# Patient Record
(demographics unavailable — no encounter records)

---

## 2024-11-07 NOTE — REVIEW OF SYSTEMS
[Negative] : Allergic/Immunologic [de-identified] : Pins and needles in feet > hands, starting to improve

## 2024-11-07 NOTE — PHYSICAL EXAM
[Fully active, able to carry on all pre-disease performance without restriction] : Status 0 - Fully active, able to carry on all pre-disease performance without restriction [Obese] : obese [Normal] : affect appropriate [de-identified] : Minimal edema in RUE which is stable [de-identified] : Bilateral ALBINA flaps with palpable mass in right, lateral upper reconstructed breast.  RIght breast approximately 2:00 o'clock ~ 1x1 cm nodularity, consistent with fat necrosis. + Tattoo 3D nipples and areolar

## 2024-11-07 NOTE — OB HISTORY
[Definite:  ___ (Date)] : the last menstrual period was [unfilled] [Normal Amount/Duration] : was of a normal amount and duration [Regular Cycle Intervals] : periods have been regular [Menarche Age: ____] : age at menarche was [unfilled] [___] : Full Term: [unfilled]

## 2024-11-07 NOTE — HISTORY OF PRESENT ILLNESS
[Disease: _____________________] : Disease: [unfilled] [T: ___] : T[unfilled] [N: ___] : N[unfilled] [M: ___] : M[unfilled] [AJCC Stage: ____] : AJCC Stage: [unfilled] [de-identified] : Left breast ADH 5/08 Left breast LCIS on excisional biopsy 6/11/08 Left breast cancer diagnosed at age 48. 2/2015 Left breast mass seen on MRI  2015 Biopsy revealed poorly differentiated IDC measuring at least 1.1 cm with SBR 9/9 and ER/MI negative and HER-2 positive 3/19/15 Bilateral mastectomies and bilateral SLN by Dr. Palleschi with ALBINA flap reconstruction 4/30/15 - 7/15 Taxotere/Carboplatin/Herceptin x 4 with Neulasta day 2 7/15 - 3/31/16 Herceptin continued every 3 weeks  10/29/16 G[employer]u 24 gene panel negative for clinically significant mutations 4/16 Removal of mediport and developed DVT in subclavian vein. Treated with Xarelto until 3/18 and then as per Vascular Surgery d/c Xarelto and started aspirin 11/24/22 RUE DVT recurrence with no provoking event. Doppler revealed acute DVT of right mid/lateral subclavian, axillary, brachial veins, and superficial venous thrombosis of right basilic vein. Chronic DVT/venous change involving the right internal jugular and medial right subclavian veins. Xarelto started. [de-identified] : 1.8 cm Poorly differentiated IDC, SBR 8/9, ER/VA negative, HER-2 positive, 3 SLN negative [de-identified] : Jami underwent Bilateral mastectomies in 3/2015 followed by [facility] for ER/[place] negative, HER-2 positive disease. She had a RUE DVT in the subclavian vein in 2017 associated with her mediport emoval. She developed a RUE DVT on Thanksgiving, 2022. She was seen in the [facility] ED and Doppler showed  acute DVT of right mid/lateral subclavian, axillary, brachial veins, and superficial venous thrombosis of right basilic vein. Chronic DVT/venous change involving the right internal jugular and medial right subclavian veins. Xarelto was started. She has mild persistent neuropathy in her feet, which is managed fairly well with Cymbalta. The lymphedema has been fairly well controlled and has improved while on Xarelto for the recurrent RUE DVT She continues to be very anxious about her risk of cancer recurrence but is doing well overall  Routine Health Maintenance: PCP: Dr. Christian Barnes Gyn: Dr. Miryam Leon Mammogram: s/p bilateral mastectomies Pap Smear: 5/25/23 negative Bone density: baseline study 02/09/21 normal Colonoscopy: 2/22/19 negative Genetics: 10/29/16 [employer]s 24 gene panel negative for clinically significant mutations                  Heterozygous for Prothrombin G gene mutation

## 2024-12-02 NOTE — HISTORY OF PRESENT ILLNESS
[FreeTextEntry1] : feb 2015 bx w breast  ca  s/p aron mastectomy  s/p  rt sided post  for chemo dx w dvt after port was removed  was started on xarelto pt states that she has intermittent  right base of the neck mild discomfort and intermittent right arm swelling w mild discomfort  w activity  which improves w rest  [de-identified] : pt is here to evaluate RUE denies problems with RUE right arm swelling is overall stable hx of RUE dvt is on lifelong xarelto  20 mg daily  pt has a positive  w/u for  hypercoag state of leiden fact 5  pt was recommended by her hematologist  lifelong anticoag rx

## 2024-12-02 NOTE — PHYSICAL EXAM
[2+] : left 2+ [No Rash or Lesion] : No rash or lesion [Alert] : alert [Oriented to Person] : oriented to person [Oriented to Place] : oriented to place [Oriented to Time] : oriented to time [Calm] : calm [1+] : left 1+ [Right Carotid Bruit] : no bruit heard over the right carotid [Left Carotid Bruit] : no bruit heard over the left carotid [Ankle Swelling (On Exam)] : not present [de-identified] : nad [de-identified] : wnl  [de-identified] : no respiratory distress [FreeTextEntry1] : mild rue edema no significant leg edema no wounds [de-identified] : wnl [de-identified] : Kun Cranial nerves 2-12 kun grossly intact [de-identified] : cooperative

## 2024-12-02 NOTE — ASSESSMENT
[Arterial/Venous Disease] : arterial/venous disease [Medication Management] : medication management [FreeTextEntry1] : Impression pt w hypercoag state w recurrent rue and sv and innom vein dvt with resolution, clinically stable    Plan Med/conserv management rue elevation and compression sleeve prn continue xarelto lifelong as per heme xarelto management as per heme ov w rue venous duplex s/o dvt in 12 mo dec 2025 rto prn

## 2024-12-02 NOTE — DATA REVIEWED
[FreeTextEntry1] : 6/23/2017 Right arm and neck duplex  sig for chronic partial  thrombus in the  Rt IJ w leda thrombus in rt innominate v  and rt subclavian vein  wall thickening  rt ax v, brach v and basilic v  neg for dvt  Outside facility study  report reviewed  11/24/2022  RUE venous duplex                                sig for  acute dvt of  right  mid/lateral  SV, axillary v , brachia v  and svt of basilic v                               sig for  chronic dvt of   rt ijv,  medial sv   12/20/2022   RUE Venous Duplex  sig for chronic ijv, innom v part recannaliz dvt                                                         sig for  sub acute  non occ  dvt in subclav v and axillary v    1/24/2023  RUE Venous Duplex  sig for chronic recannaliz  ijv                                                     sig for  sub acute  non occ  dvt in innom v, subclav v                                                        resolution of ax v  dvt    5/9/2023  RUE Venous Duplex  sig for chronic non occ IJV and prox SV                                                       patent and compressible rt ax v   11/14/2023 RUE Venous Duplex sig for chronic recanalized DVT in innominate vein, IJV, SV and ax vein  12/2/2024 RUE Venous Duplex sig for chronic recanalized dvt in IJV, innominate vein, sub and ax vein

## 2024-12-16 NOTE — HISTORY OF PRESENT ILLNESS
[FreeTextEntry1] : planning d and c friday. for bleeding. BP elevated at other offices. Reports taking medications including carvedilol as directed.   Raisa "She is doing well and has been taking her carvedilol as directed.  She had a rough time for the first 4 days but has since been feeling better. She is accompanied by her daughter. She is taking all her medications as directed.  Prior: Didnt tolerate aspirin due to rash. She is taknig her meds as directed.  Off aspirin and plavix (vaginal bleeding) Off enalapril too with improved rash.  LDL at goal. TG unchanged A1c = 7.2

## 2024-12-16 NOTE — DISCUSSION/SUMMARY
[FreeTextEntry1] : She is a 57-year-old woman with stage I breast cancer, status post lumpectomy, who is s/p Herceptin related cardiomyopathy that has resolved with medical therapy and discontinuation of Herceptin. Prothombin gene mutation and Factor V Leyden. Didn't tolerate anti-platelet therapy due to bleeding. ECG today shows normal sinus rhythm with LVH HTN: Blood pressure is high consistently.  Continue carvedilol 25 mg twice daily. Will add diovan 80 mg bid. BP followup with Zuri in 1 month With the addition of valsartan her blood pressure should improve and she may proceed with the planned GYN surgery. She was instructed to withhold her Xarelto for 2 full days prior to the surgery.  I agree. She will restart anticoagulation once the bleeding from the surgery has stopped.   [EKG obtained to assist in diagnosis and management of assessed problem(s)] : EKG obtained to assist in diagnosis and management of assessed problem(s)

## 2024-12-17 NOTE — HISTORY OF PRESENT ILLNESS
[No Pertinent Pulmonary History] : no history of asthma, COPD, sleep apnea, or smoking [No Adverse Anesthesia Reaction] : no adverse anesthesia reaction in self or family member [Chronic Anticoagulation] : chronic anticoagulation [Chronic Kidney Disease] : no chronic kidney disease [Diabetes] : diabetes [(Patient denies any chest pain, claudication, dyspnea on exertion, orthopnea, palpitations or syncope)] : Patient denies any chest pain, claudication, dyspnea on exertion, orthopnea, palpitations or syncope [FreeTextEntry1] : D+C [FreeTextEntry2] : 12/20/24 [FreeTextEntry3] : Dr. Leon [FreeTextEntry4] : Presents for presurgical clearance.  Denies recent illness, cough, temp elevation, urinary symptoms. Note - has seen Cardiology. [FreeTextEntry5] : Patient has stable cardiomyopathy - followed by Cardiology

## 2024-12-17 NOTE — RESULTS/DATA
[] : results reviewed [de-identified] : acceptable [de-identified] : acceptable [de-identified] : HbA1C acceptable for this procedure

## 2025-02-06 NOTE — DISCUSSION/SUMMARY
[FreeTextEntry1] : She is a 58-year-old woman with stage I breast cancer, status post lumpectomy, who is s/p Herceptin related cardiomyopathy that has resolved with medical therapy and discontinuation of Herceptin. Prothrombin gene mutation and Factor V Leyden. Didn't tolerate anti-platelet therapy due to bleeding. ECG today shows normal sinus rhythm with LVH HTN: Blood pressure is high consistently. continue amlodipine 5,  Increase carvedilol to 37.5 mg twice daily.  (didn't tolerate ARB) See me in a few weeks for BP monitoring. [EKG obtained to assist in diagnosis and management of assessed problem(s)] : EKG obtained to assist in diagnosis and management of assessed problem(s)

## 2025-02-06 NOTE — HISTORY OF PRESENT ILLNESS
[FreeTextEntry1] : Taking meds as directed. Some ankle edema while on vacation in [facility]. New generic carvediol noted.    Prior: planning d and c friday. for bleeding. BP elevated at other offices. Reports taking medications including carvedilol as directed.  Raisa "She is doing well and has been taking her carvedilol as directed.  She had a rough time for the first 4 days but has since been feeling better. She is accompanied by her daughter. She is taking all her medications as directed.  Prior: Didnt tolerate aspirin due to rash. She is taknig her meds as directed.  Off aspirin and plavix (vaginal bleeding) Off enalapril too with improved rash.  LDL at goal. TG unchanged A1c = 7.2

## 2025-02-19 NOTE — PHYSICAL EXAM
[Alert] : alert [Normal Voice/Communication] : normal voice/communication [Healthy Appearing] : healthy appearing [No Acute Distress] : no acute distress [Sclera] : the sclera and conjunctiva were normal [Hearing Threshold Finger Rub Not [place]] : hearing was normal [Normal Lips/Gums] : the lips and gums were normal [Oropharynx] : the oropharynx was normal [Normal Appearance] : the appearance of the neck was normal [No Neck Mass] : no neck mass was observed [No Respiratory Distress] : no respiratory distress [No Acc Muscle Use] : no accessory muscle use [Auscultation Breath Sounds / Voice Sounds] : lungs were clear to auscultation bilaterally [Respiration, Rhythm And Depth] : normal respiratory rhythm and effort [Heart Rate And Rhythm] : heart rate was normal and rhythm regular [Normal S1, S2] : normal S1 and S2 [Murmurs] : no murmurs [Bowel Sounds] : normal bowel sounds [Abdomen Tenderness] : non-tender [No Masses] : no abdominal mass palpated [Abdomen Soft] : soft [] : no hepatosplenomegaly [Oriented To Time, Place, And Person] : oriented to person, place, and time

## 2025-02-19 NOTE — HISTORY OF PRESENT ILLNESS
[FreeTextEntry1] : Jami for a follow-up visit.  She relates that as long as she takes brand-name Nexium 40 mg every other day alternating with famotidine 40 mg, her heartburn is well-controlled.  When she tries to stretch the Nexium to more than 2 days she develops heartburn symptoms.  She denies a dysphagia or odynophagia.  She denies abdominal pain, nausea or vomiting.  She denies changes in bowel habits.  She denies rectal bleeding or melena.  She denies weight loss or anemia.

## 2025-02-19 NOTE — ASSESSMENT
[FreeTextEntry1] : This is a 58-year-old female with chronic GERD dependent on Nexium 40 mg daily.  She had tried generic brand Nexium and other proton pump inhibitors without response or have side effects of diarrhea.  The brand-name Nexium seems to be only medication that works for her.  She is trying to stretch out the frequency of taking Nexium but seems to can go as far as every other day before getting symptoms.  She alternates the Nexium with famotidine 40 mg daily.  She is currently having a hard time obtaining Nexium from her insurance company as it seems that her insurance company currently is allowing 90-day supply for a year.  Last year, she did not require a prior authorization to have Nexium as long as it is sent in as a "brand name".  Will again try to appeal/obtain prior authorization for her to receive Nexium.  I wrote a letter of medical necessity.

## 2025-02-20 NOTE — DISCUSSION/SUMMARY
[FreeTextEntry1] : She is a 58-year-old woman with stage I breast cancer, status post lumpectomy, who is s/p Herceptin related cardiomyopathy that has resolved with medical therapy and discontinuation of Herceptin. Prothrombin gene mutation and Factor V Leyden. Didn't tolerate anti-platelet therapy due to bleeding. ECG today shows normal sinus rhythm with LVH HTN: Blood pressure is much improved. Continue amlodipine 5, carvedilol to 37.5 mg twice daily.  (didn't tolerate ARB) 6 months followup. [EKG obtained to assist in diagnosis and management of assessed problem(s)] : EKG obtained to assist in diagnosis and management of assessed problem(s)

## 2025-02-20 NOTE — HISTORY OF PRESENT ILLNESS
[FreeTextEntry1] : She has been taking her medication without difficulty. No more ankle edema.  Prior: Taking meds as directed. Some ankle edema while on vacation in [facility]. New generic carvediol noted.  Prior: planning d and c friday. for bleeding. BP elevated at other offices. Reports taking medications including carvedilol as directed.  Mariluro "She is doing well and has been taking her carvedilol as directed.  She had a rough time for the first 4 days but has since been feeling better. She is accompanied by her daughter. She is taking all her medications as directed.  Prior: Didnt tolerate aspirin due to rash. She is taknig her meds as directed.  Off aspirin and plavix (vaginal bleeding) Off enalapril too with improved rash.  LDL at goal. TG unchanged A1c = 7.2

## 2025-03-17 NOTE — ASSESSMENT
[FreeTextEntry1] : Hemodynamically stable with acceptable BP and no clinical evidence of decompensation Lab profiles drawn in office and sent

## 2025-03-17 NOTE — HISTORY OF PRESENT ILLNESS
[de-identified] : Presents for BP check, labs, and general follow-up.  States feeling generally well; trying to maintain a healthy diet - note desirable weight loss.

## 2025-07-30 NOTE — ASSESSMENT
[FreeTextEntry1] : H/O left LCIS/ADH (2008) and left breast cancer (Stage IA, ER-/MA-/Her2+, 3/2015) Bilateral palpable fat necrosis Right upper extremity lymphedema No evidence of disease recurrence on CBE   1. Follow up office visit due 8/2026. 2. Advised monthly self breast examinations and advised her to contact me if she has any concerns.

## 2025-07-30 NOTE — HISTORY OF PRESENT ILLNESS
[FreeTextEntry1] : She has a history of Stage IA left breast cancer (ER-/MO-/Her2+) 6/2008 status post left upper outer surgical excision biopsy for LCIS/ADH 3/19/2015 status post bilateral total mastectomies, left axillary sentinel lymph node biopsy with bilateral ALBINA flap reconstruction 10/23/2015 status post nipple areolar reconstruction, revision and fat grafting Status post nipple areolar tattooing x 3 Plastic Surgeon: Dr. Adan. She had 3-D N/A tattooing in December Genetic testing negative Med ONC: Dr. Obregon. She completed chemotherapy with [facility]. She saw her 11/7/2024. WTH: right upper extremity lymphedema DVT of right arm November 24th, on Xarelto. She denies any new breast concerns.

## 2025-07-30 NOTE — PHYSICAL EXAM
[Normocephalic] : normocephalic [Atraumatic] : atraumatic [EOMI] : extra ocular movement intact [Sclera nonicteric] : sclera nonicteric [Supple] : supple [No Supraclavicular Adenopathy] : no supraclavicular adenopathy [No Cervical Adenopathy] : no cervical adenopathy [No Thyromegaly] : no thyromegaly [Examined in the supine and seated position] : examined in the supine and seated position [Asymmetrical] : asymmetrical [No dominant masses] : no dominant masses in right breast  [No dominant masses] : no dominant masses left breast [No Axillary Lymphadenopathy] : no left axillary lymphadenopathy [No Edema] : no edema [No Rashes] : no rashes [No Ulceration] : no ulceration [de-identified] : Subtle palp left supraclavicular measuring 6 x 7 mm c/w lipoma [de-identified] : S/P bilateral mastectomies with ALBINA flaps, N/A reconstruction.  Her right breast is slightly leija than her left. [de-identified] : Known palpable fat necrosis 3:30 (N9.5cm) subtle 5 x 5 mm without change. [de-identified] : Known palpable fat necrosis 8:30 (N8cm) 2.8 x 3.1 cm without change. [de-identified] : Right upper extremity lymphedema: moderate upper arm, mild fingers and forearm

## 2025-07-30 NOTE — CONSULT LETTER
[Dear  ___] : Dear  [unfilled], [Courtesy Letter:] : I had the pleasure of seeing your patient, [unfilled], in my office today. [Please see my note below.] : Please see my note below. [Sincerely,] : Sincerely, [FreeTextEntry3] : Susan M. Palleschi, MD, FACS\par  Division of Breast Surgery\par  Director, Breast Surgery\par  [facility]\par  [address] [place] [address]\par  [address]\[place] [address]\par  (Phone) [phone]\par  (Fax) [phone]  [DrMadonna  ___] : Dr. OSUNA

## 2025-07-30 NOTE — CONSULT LETTER
[Dear  ___] : Dear  [unfilled], [Courtesy Letter:] : I had the pleasure of seeing your patient, [unfilled], in my office today. [Please see my note below.] : Please see my note below. [Sincerely,] : Sincerely, [FreeTextEntry3] : Susan M. Palleschi, MD, FACS\par  Division of Breast Surgery\par  Director, Breast Surgery\par  [facility]\par  [address] [place] [address]\par  [address]\[place] [address]\par  (Phone) [phone]\par  (Fax) [phone]  [DrMadonna  ___] : Dr. OSUNA

## 2025-07-30 NOTE — ASSESSMENT
[FreeTextEntry1] : H/O left LCIS/ADH (2008) and left breast cancer (Stage IA, ER-/DE-/Her2+, 3/2015) Bilateral palpable fat necrosis Right upper extremity lymphedema No evidence of disease recurrence on CBE   1. Follow up office visit due 8/2026. 2. Advised monthly self breast examinations and advised her to contact me if she has any concerns.

## 2025-07-30 NOTE — PHYSICAL EXAM
[Normocephalic] : normocephalic [Atraumatic] : atraumatic [EOMI] : extra ocular movement intact [Sclera nonicteric] : sclera nonicteric [Supple] : supple [No Supraclavicular Adenopathy] : no supraclavicular adenopathy [No Cervical Adenopathy] : no cervical adenopathy [No Thyromegaly] : no thyromegaly [Examined in the supine and seated position] : examined in the supine and seated position [Asymmetrical] : asymmetrical [No dominant masses] : no dominant masses in right breast  [No dominant masses] : no dominant masses left breast [No Axillary Lymphadenopathy] : no left axillary lymphadenopathy [No Edema] : no edema [No Rashes] : no rashes [No Ulceration] : no ulceration [de-identified] : Subtle palp left supraclavicular measuring 6 x 7 mm c/w lipoma [de-identified] : S/P bilateral mastectomies with ALBINA flaps, N/A reconstruction.  Her right breast is slightly leija than her left. [de-identified] : Known palpable fat necrosis 3:30 (N9.5cm) subtle 5 x 5 mm without change. [de-identified] : Known palpable fat necrosis 8:30 (N8cm) 2.8 x 3.1 cm without change. [de-identified] : Right upper extremity lymphedema: moderate upper arm, mild fingers and forearm

## 2025-07-30 NOTE — HISTORY OF PRESENT ILLNESS
[FreeTextEntry1] : She has a history of Stage IA left breast cancer (ER-/[place]-/Her2+) 6/2008 status post left upper outer surgical excision biopsy for LCIS/ADH 3/19/2015 status post bilateral total mastectomies, left axillary sentinel lymph node biopsy with bilateral ALBINA flap reconstruction 10/23/2015 status post nipple areolar reconstruction, revision and fat grafting Status post nipple areolar tattooing x 3 Plastic Surgeon: Dr. Adan. She had 3-D N/A tattooing in December Genetic testing negative Med ONC: Dr. Obregon. She completed chemotherapy with [facility]. She saw her 11/7/2024. WTH: right upper extremity lymphedema DVT of right arm November 24th, on Xarelto. She denies any new breast concerns.